# Patient Record
Sex: MALE | Race: WHITE | Employment: FULL TIME | URBAN - METROPOLITAN AREA
[De-identification: names, ages, dates, MRNs, and addresses within clinical notes are randomized per-mention and may not be internally consistent; named-entity substitution may affect disease eponyms.]

---

## 2022-05-08 ENCOUNTER — HOSPITAL ENCOUNTER (EMERGENCY)
Age: 52
Discharge: HOME | End: 2022-05-08
Attending: EMERGENCY MEDICINE
Payer: COMMERCIAL

## 2022-05-08 ENCOUNTER — APPOINTMENT (OUTPATIENT)
Dept: GENERAL RADIOLOGY | Age: 52
End: 2022-05-08
Attending: EMERGENCY MEDICINE
Payer: COMMERCIAL

## 2022-05-08 VITALS
RESPIRATION RATE: 19 BRPM | HEART RATE: 68 BPM | DIASTOLIC BLOOD PRESSURE: 77 MMHG | BODY MASS INDEX: 27.92 KG/M2 | OXYGEN SATURATION: 94 % | TEMPERATURE: 97.9 F | HEIGHT: 70 IN | SYSTOLIC BLOOD PRESSURE: 122 MMHG | WEIGHT: 195 LBS

## 2022-05-08 DIAGNOSIS — R55 VASOVAGAL EPISODE: ICD-10-CM

## 2022-05-08 DIAGNOSIS — R19.7 NAUSEA VOMITING AND DIARRHEA: ICD-10-CM

## 2022-05-08 DIAGNOSIS — R11.2 NAUSEA VOMITING AND DIARRHEA: ICD-10-CM

## 2022-05-08 DIAGNOSIS — R55 SYNCOPE AND COLLAPSE: Primary | ICD-10-CM

## 2022-05-08 LAB
ALBUMIN SERPL-MCNC: 3.8 G/DL (ref 3.5–5)
ALBUMIN/GLOB SERPL: 1.4 {RATIO}
ALP SERPL-CCNC: 67 U/L (ref 45–117)
ALT SERPL-CCNC: 14 U/L (ref 13–61)
ANION GAP SERPL CALC-SCNC: 13 MMOL/L (ref 7–16)
AST SERPL-CCNC: 25 U/L (ref 15–37)
BASOPHILS # BLD: 0 K/UL (ref 0–0.2)
BASOPHILS NFR BLD: 0 % (ref 0–2)
BILIRUB SERPL-MCNC: 1.2 MG/DL (ref 0.2–1.1)
BUN SERPL-MCNC: 18 MG/DL (ref 7–18)
CALCIUM SERPL-MCNC: 8.6 MG/DL (ref 8.3–10.4)
CHLORIDE SERPL-SCNC: 105 MMOL/L (ref 98–107)
CO2 SERPL-SCNC: 22 MMOL/L (ref 21–32)
CREAT SERPL-MCNC: 0.87 MG/DL (ref 0.8–1.5)
DIFFERENTIAL METHOD BLD: ABNORMAL
EOSINOPHIL # BLD: 0.1 K/UL (ref 0–0.8)
EOSINOPHIL NFR BLD: 2 % (ref 0.5–7.8)
ERYTHROCYTE [DISTWIDTH] IN BLOOD BY AUTOMATED COUNT: 12.8 % (ref 11.9–14.6)
GLOBULIN SER CALC-MCNC: 2.7 G/DL (ref 2.3–3.5)
GLUCOSE SERPL-MCNC: 119 MG/DL (ref 65–100)
HCT VFR BLD AUTO: 47 % (ref 41.1–50.3)
HGB BLD-MCNC: 15.4 G/DL (ref 13.6–17.2)
IMM GRANULOCYTES # BLD AUTO: 0 K/UL (ref 0–0.5)
IMM GRANULOCYTES NFR BLD AUTO: 0 % (ref 0–5)
LIPASE SERPL-CCNC: 20 U/L (ref 13–60)
LYMPHOCYTES # BLD: 0.7 K/UL (ref 0.5–4.6)
LYMPHOCYTES NFR BLD: 11 % (ref 13–44)
MCH RBC QN AUTO: 29.6 PG (ref 26.1–32.9)
MCHC RBC AUTO-ENTMCNC: 32.8 G/DL (ref 31.4–35)
MCV RBC AUTO: 90.4 FL (ref 79.6–97.8)
MONOCYTES # BLD: 0.5 K/UL (ref 0.1–1.3)
MONOCYTES NFR BLD: 9 % (ref 4–12)
NEUTS SEG # BLD: 4.5 K/UL (ref 1.7–8.2)
NEUTS SEG NFR BLD: 78 % (ref 43–78)
NRBC # BLD: 0 K/UL (ref 0–0.2)
PLATELET # BLD AUTO: 148 K/UL (ref 150–450)
PMV BLD AUTO: 10.9 FL (ref 9.4–12.3)
POTASSIUM SERPL-SCNC: 3.6 MMOL/L (ref 3.5–5.1)
PROT SERPL-MCNC: 6.5 G/DL (ref 6.4–8.2)
RBC # BLD AUTO: 5.2 M/UL (ref 4.23–5.6)
SODIUM SERPL-SCNC: 140 MMOL/L (ref 136–145)
TROPONIN T SERPL HS-MCNC: <6 NG/L (ref 0–22)
TROPONIN T SERPL HS-MCNC: <6 NG/L (ref 0–22)
WBC # BLD AUTO: 5.8 K/UL (ref 4.3–11.1)

## 2022-05-08 PROCEDURE — 85025 COMPLETE CBC W/AUTO DIFF WBC: CPT

## 2022-05-08 PROCEDURE — 71045 X-RAY EXAM CHEST 1 VIEW: CPT

## 2022-05-08 PROCEDURE — 84484 ASSAY OF TROPONIN QUANT: CPT

## 2022-05-08 PROCEDURE — 99285 EMERGENCY DEPT VISIT HI MDM: CPT

## 2022-05-08 PROCEDURE — 83690 ASSAY OF LIPASE: CPT

## 2022-05-08 PROCEDURE — 74011250636 HC RX REV CODE- 250/636: Performed by: EMERGENCY MEDICINE

## 2022-05-08 PROCEDURE — 93005 ELECTROCARDIOGRAM TRACING: CPT | Performed by: EMERGENCY MEDICINE

## 2022-05-08 PROCEDURE — 80053 COMPREHEN METABOLIC PANEL: CPT

## 2022-05-08 PROCEDURE — 96374 THER/PROPH/DIAG INJ IV PUSH: CPT

## 2022-05-08 RX ORDER — SODIUM CHLORIDE 0.9 % (FLUSH) 0.9 %
5-10 SYRINGE (ML) INJECTION AS NEEDED
Status: DISCONTINUED | OUTPATIENT
Start: 2022-05-08 | End: 2022-05-08 | Stop reason: HOSPADM

## 2022-05-08 RX ORDER — ONDANSETRON 4 MG/1
4 TABLET, FILM COATED ORAL
Qty: 12 TABLET | Refills: 2 | Status: SHIPPED | OUTPATIENT
Start: 2022-05-08

## 2022-05-08 RX ORDER — FAMOTIDINE 20 MG/1
20 TABLET, FILM COATED ORAL 2 TIMES DAILY
Qty: 20 TABLET | Refills: 0 | Status: SHIPPED | OUTPATIENT
Start: 2022-05-08 | End: 2022-05-18

## 2022-05-08 RX ORDER — SODIUM CHLORIDE 0.9 % (FLUSH) 0.9 %
5-10 SYRINGE (ML) INJECTION EVERY 8 HOURS
Status: DISCONTINUED | OUTPATIENT
Start: 2022-05-08 | End: 2022-05-08 | Stop reason: HOSPADM

## 2022-05-08 RX ORDER — ONDANSETRON 2 MG/ML
4 INJECTION INTRAMUSCULAR; INTRAVENOUS
Status: COMPLETED | OUTPATIENT
Start: 2022-05-08 | End: 2022-05-08

## 2022-05-08 RX ADMIN — ONDANSETRON 4 MG: 2 INJECTION INTRAMUSCULAR; INTRAVENOUS at 10:52

## 2022-05-08 NOTE — ED TRIAGE NOTES
Patient brought in by EMS. Ambulated from stretcher to ER bed without difficulty. Patient was in [place] this morning and had 2 syncopal episodes during the service. He is not sure if he lost consciousness but states \"I blacked out twice. \"  Satred feeling nauseated after supper yesterday, otherwise no recent illness.

## 2022-05-08 NOTE — ED NOTES
I have reviewed discharge instructions with the patient. The patient verbalized understanding. Patient left ED via Discharge Method: ambulatory to Home with Family. Opportunity for questions and clarification provided. Patient given 2 scripts. To continue your aftercare when you leave the hospital, you may receive an automated call from our care team to check in on how you are doing. This is a free service and part of our promise to provide the best care and service to meet your aftercare needs.  If you have questions, or wish to unsubscribe from this service please call [phone]. Thank you for Choosing our [facility] Emergency Department.

## 2022-05-08 NOTE — ED PROVIDER NOTES
57-year-old male brought in by EMS. Syncopal episode at [employer]. Patient states he started feeling bad with some abdominal distention discomfort nausea after eating fast food about noon yesterday. That persisted through the evening. Woke up 3 AM with vomiting and diarrhea. No bleeding. He had chills and sweats but no fever. Abdominal discomfort and minimal cramping. Was at [employer] today when he started feeling lightheaded and everything went dark and he blacked out once or twice. EMS was called. Found him in normal sinus rhythm. Patient really denies any pain other than some general abdominal discomfort. No headache, chest pain, shortness of breath. No back pain. No sick contacts. Patient states no medical problems no surgery does not take any medications. His wife states also that she had some vomiting and some nausea yesterday but is doing better at this point. She states the patient was in [employer] when he seemed to not respond for a few seconds with some snoring. There was no jerking. Lasted a few seconds and then happened again. May have happened a third time after some acquaintances laid him on the floor. There were no complaints of pain or cyanosis or focal neurologic issues. The history is provided by the patient. Dizziness  This is a new problem. The current episode started less than 1 hour ago. The problem has been gradually improving. There was no focality noted. Pertinent negatives include no movement disorder. There has been no fever. Associated symptoms include vomiting and nausea. Pertinent negatives include no shortness of breath, no chest pain and no headaches. No past medical history on file. No past surgical history on file. No family history on file.     Social History     Socioeconomic History    Marital status:      Spouse name: Not on file    Number of children: Not on file    Years of education: Not on file    Highest education level: Not on file   Occupational History    Not on file   Tobacco Use    Smoking status: Not on file    Smokeless tobacco: Not on file   Substance and Sexual Activity    Alcohol use: Not on file    Drug use: Not on file    Sexual activity: Not on file   Other Topics Concern    Not on file   Social History Narrative    Not on file     Social Determinants of Health     Financial Resource Strain:     Difficulty of Paying Living Expenses: Not on file   Food Insecurity:     Worried About Running Out of Food in the Last Year: Not on file    Pura of Food in the Last Year: Not on file   Transportation Needs:     Lack of Transportation (Medical): Not on file    Lack of Transportation (Non-Medical): Not on file   Physical Activity:     Days of Exercise per Week: Not on file    Minutes of Exercise per Session: Not on file   Stress:     Feeling of Stress : Not on file   Social Connections:     Frequency of Communication with Friends and Family: Not on file    Frequency of Social Gatherings with Friends and Family: Not on file    Attends [employer]ology Services: Not on file    Active Member of [address] [place] [address] or Organizations: Not on file    Attends Club or Organization Meetings: Not on file    Marital Status: Not on file   Intimate Partner Violence:     Fear of Current or Ex-Partner: Not on file    Emotionally Abused: Not on file    Physically Abused: Not on file    Sexually Abused: Not on file   Housing Stability:     Unable to Pay for Housing in the Last Year: Not on file    Number of [place] in the Last Year: Not on file    Unstable Housing in the Last Year: Not on file         ALLERGIES: Patient has no known allergies. Review of Systems   Constitutional: Positive for chills, diaphoresis and fatigue. Negative for fever. HENT: Positive for congestion. Eyes: Negative for visual disturbance. Respiratory: Negative for cough and shortness of breath. Cardiovascular: Negative for chest pain and palpitations. Gastrointestinal: Positive for abdominal pain, diarrhea, nausea and vomiting. Negative for blood in stool. Genitourinary: Negative for dysuria and flank pain. Musculoskeletal: Negative for back pain and neck pain. Skin: Negative for color change and rash. Neurological: Positive for dizziness, syncope and light-headedness. Negative for headaches. All other systems reviewed and are negative. Vitals:    05/08/22 1004 05/08/22 1005 05/08/22 1023 05/08/22 1030   BP:  124/79 121/83 (!) 134/94   Pulse:  75  84   Resp:  15     Temp:  97.9 °F (36.6 °C)     SpO2: 96% 95%     Weight:  88.5 kg (195 lb)     Height:  5' 10\" (1.778 m)              Physical Exam  Vitals and nursing note reviewed. Constitutional:       General: He is not in acute distress. Appearance: He is well-developed. HENT:      Head: Normocephalic and atraumatic. Right Ear: External ear normal.      Left Ear: External ear normal.      Mouth/Throat:      Pharynx: No oropharyngeal exudate. Eyes:      Conjunctiva/sclera: Conjunctivae normal.      Pupils: Pupils are equal, round, and reactive to light. Cardiovascular:      Rate and Rhythm: Normal rate and regular rhythm. Heart sounds: No murmur heard. Pulmonary:      Effort: No respiratory distress. Breath sounds: Normal breath sounds. Abdominal:      General: Bowel sounds are normal.      Palpations: Abdomen is soft. There is no mass. Tenderness: There is no abdominal tenderness. There is no guarding or rebound. Hernia: No hernia is present. Musculoskeletal:         General: No swelling or tenderness. Cervical back: Normal range of motion and neck supple. Skin:     General: Skin is warm and dry. Neurological:      Mental Status: He is alert and oriented to person, place, and time. Comments: Nl speech  And gait.    Psychiatric:         Speech: Speech normal.          MDM  Number of Diagnoses or Management Options  Diagnosis management comments: Potential vasovagal episode. Assess for dehydration. Give IV fluids and nausea medication. Check EKG and troponins. Monitor for dysrhythmia. Patient has not slept much or eaten anything this morning. Did have some thin to drink. Water. Check for anemia, diabetes, screen for bacterial infection. Amount and/or Complexity of Data Reviewed  Clinical lab tests: ordered and reviewed  Tests in the radiology section of CPT®: ordered and reviewed  Tests in the medicine section of CPT®: ordered and reviewed  Obtain history from someone other than the patient: yes  Independent visualization of images, tracings, or specimens: yes (Interpretation of EKG shows normal sinus rhythm 77. No ST-T changes. No ectopy. Normal QT interval.)    Risk of Complications, Morbidity, and/or Mortality  Presenting problems: moderate  Diagnostic procedures: minimal  Management options: low    Patient Progress  Patient progress: stable         Procedures      Results Include:    Recent Results (from the past 24 hour(s))   CBC WITH AUTOMATED DIFF    Collection Time: 05/08/22 10:27 AM   Result Value Ref Range    WBC 5.8 4.3 - 11.1 K/uL    RBC 5.20 4.23 - 5.60 M/uL    HGB 15.4 13.6 - 17.2 g/dL    HCT 47.0 41.1 - 50.3 %    MCV 90.4 79.6 - 97.8 FL    MCH 29.6 26.1 - 32.9 PG    MCHC 32.8 31.4 - 35.0 g/dL    RDW 12.8 11.9 - 14.6 %    PLATELET 549 (L) 408 - 450 K/uL    MPV 10.9 9.4 - 12.3 FL    ABSOLUTE NRBC 0.00 0.0 - 0.2 K/uL    DF AUTOMATED      NEUTROPHILS 78 43 - 78 %    LYMPHOCYTES 11 (L) 13 - 44 %    MONOCYTES 9 4.0 - 12.0 %    EOSINOPHILS 2 0.5 - 7.8 %    BASOPHILS 0 0.0 - 2.0 %    IMMATURE GRANULOCYTES 0 0.0 - 5.0 %    ABS. NEUTROPHILS 4.5 1.7 - 8.2 K/UL    ABS. LYMPHOCYTES 0.7 0.5 - 4.6 K/UL    ABS. MONOCYTES 0.5 0.1 - 1.3 K/UL    ABS. EOSINOPHILS 0.1 0.0 - 0.8 K/UL    ABS. BASOPHILS 0.0 0.0 - 0.2 K/UL    ABS. IMM.  GRANS. 0.0 0.0 - 0.5 K/UL   METABOLIC PANEL, COMPREHENSIVE    Collection Time: 05/08/22 10:27 AM   Result Value Ref Range    Sodium 140 136 - 145 mmol/L    Potassium 3.6 3.5 - 5.1 mmol/L    Chloride 105 98 - 107 mmol/L    CO2 22 21 - 32 mmol/L    Anion gap 13 7.0 - 16.0 mmol/L    Glucose 119 (H) 65 - 100 mg/dL    BUN 18 7.0 - 18.0 MG/DL    Creatinine 0.87 0.8 - 1.5 MG/DL    GFR est AA >118 >60 ml/min/1.73m2    GFR est non-AA >60 >60 ml/min/1.73m2    Calcium 8.6 8.3 - 10.4 MG/DL    Bilirubin, total 1.2 (H) 0.2 - 1.1 MG/DL    ALT (SGPT) 14 13.0 - 61.0 U/L    AST (SGOT) 25 15 - 37 U/L    Alk. phosphatase 67 45.0 - 117.0 U/L    Protein, total 6.5 6.4 - 8.2 g/dL    Albumin 3.8 3.5 - 5.0 g/dL    Globulin 2.7 2.3 - 3.5 g/dL    A-G Ratio 1.4     LIPASE    Collection Time: 05/08/22 10:27 AM   Result Value Ref Range    Lipase 20 13 - 60 U/L   TROPONIN-T    Collection Time: 05/08/22 10:30 AM   Result Value Ref Range    Troponin-T, QT, High sensitivity <6.0 0 - 22 ng/L     XR CHEST PORT    Result Date: 5/8/2022  PORTABLE CHEST, May 8, 2022 at 1026 hours CLINICAL HISTORY:  2 syncopal episodes this morning. COMPARISON:  None. FINDINGS:  AP erect image demonstrates no confluent infiltrate or significant pleural fluid. There is mild atelectasis/infiltrate at the left lung base. The heart size is within normal limits without evidence of congestive heart failure or pneumothorax. The bony thorax appears intact on this view. There are overlying radiopaque support devices. MILD LEFT BASILAR ATELECTASIS/INFILTRATE. Patient without cough. No shortness of breath. Does have some slight left-sided chest pain. He is tender somewhat at the left costal margin anteriorly which she says is sore. This started up early this morning. May be a consequence of the vomiting. Doubt pneumonia. Doubt pulmonary embolism. 11:22 AM  Resting more comfortably. No more episodes. No vomiting. No dysrhythmia on monitor. Plan is to check in second troponin. If no more symptoms, suspect will be stable for discharge home.

## 2022-05-08 NOTE — DISCHARGE INSTRUCTIONS
Sips clear liquids 6-12 hours, then [employer] [facility] (bananas, rice, apple sauce, toast). Advance to soup/sanwiches as tolerated. Recheck your doctor 2 days if not improving. Recheck sooner for worse pain/fever/vomiting/bleeding. Tylenol if fever. If prescribed, phenergan or Zofran are for Nausea. Over-the-counter Imodium for diarrhea. If episodes recur, call primary care doctor for appointment follow-up. Recheck here sooner for worse or worrisome symptoms.

## 2022-05-18 PROBLEM — R10.11 RIGHT UPPER QUADRANT ABDOMINAL PAIN: Status: ACTIVE | Noted: 2022-05-18

## 2022-05-18 PROBLEM — R42 DIZZINESS: Status: ACTIVE | Noted: 2022-05-18

## 2022-05-25 ENCOUNTER — OFFICE VISIT (OUTPATIENT)
Dept: FAMILY MEDICINE CLINIC | Facility: CLINIC | Age: 52
End: 2022-05-25
Payer: COMMERCIAL

## 2022-05-25 VITALS
OXYGEN SATURATION: 97 % | SYSTOLIC BLOOD PRESSURE: 110 MMHG | HEART RATE: 76 BPM | HEIGHT: 70 IN | WEIGHT: 201.8 LBS | BODY MASS INDEX: 28.89 KG/M2 | DIASTOLIC BLOOD PRESSURE: 78 MMHG

## 2022-05-25 DIAGNOSIS — Z12.11 SCREENING FOR COLON CANCER: ICD-10-CM

## 2022-05-25 DIAGNOSIS — K80.20 CALCULUS OF GALLBLADDER WITHOUT CHOLECYSTITIS WITHOUT OBSTRUCTION: ICD-10-CM

## 2022-05-25 DIAGNOSIS — Z00.00 ANNUAL PHYSICAL EXAM: Primary | ICD-10-CM

## 2022-05-25 DIAGNOSIS — Z23 IMMUNIZATION DUE: ICD-10-CM

## 2022-05-25 DIAGNOSIS — R42 DIZZINESS: ICD-10-CM

## 2022-05-25 PROCEDURE — 99396 PREV VISIT EST AGE 40-64: CPT | Performed by: FAMILY MEDICINE

## 2022-05-25 PROCEDURE — 99213 OFFICE O/P EST LOW 20 MIN: CPT | Performed by: FAMILY MEDICINE

## 2022-05-25 RX ORDER — ZOSTER VACCINE RECOMBINANT, ADJUVANTED 50 MCG/0.5
0.5 KIT INTRAMUSCULAR SEE ADMIN INSTRUCTIONS
Qty: 0.5 ML | Refills: 0 | Status: SHIPPED | OUTPATIENT
Start: 2022-05-25 | End: 2022-11-21

## 2022-05-25 RX ORDER — LORATADINE 10 MG/1
10 TABLET ORAL DAILY
COMMUNITY

## 2022-05-25 ASSESSMENT — ENCOUNTER SYMPTOMS
EYE ITCHING: 0
NAUSEA: 0
EYE DISCHARGE: 0
CONSTIPATION: 0
COUGH: 0
SORE THROAT: 0
VOMITING: 0
ABDOMINAL PAIN: 0
WHEEZING: 0
DIARRHEA: 0
COLOR CHANGE: 0
FACIAL SWELLING: 0
SHORTNESS OF BREATH: 0
SINUS PAIN: 0
SINUS PRESSURE: 0
BACK PAIN: 0
BLOOD IN STOOL: 0
EYE PAIN: 0
ABDOMINAL DISTENTION: 0
RHINORRHEA: 0
EYE REDNESS: 0
STRIDOR: 0
CHEST TIGHTNESS: 0

## 2022-05-25 ASSESSMENT — PATIENT HEALTH QUESTIONNAIRE - PHQ9
SUM OF ALL RESPONSES TO PHQ QUESTIONS 1-9: 0
1. LITTLE INTEREST OR PLEASURE IN DOING THINGS: 0
SUM OF ALL RESPONSES TO PHQ QUESTIONS 1-9: 0
2. FEELING DOWN, DEPRESSED OR HOPELESS: 0
SUM OF ALL RESPONSES TO PHQ9 QUESTIONS 1 & 2: 0

## 2022-05-25 ASSESSMENT — LIFESTYLE VARIABLES
HOW OFTEN DO YOU HAVE A DRINK CONTAINING ALCOHOL: 2-3 TIMES A WEEK
HOW MANY STANDARD DRINKS CONTAINING ALCOHOL DO YOU HAVE ON A TYPICAL DAY: 1 OR 2

## 2022-05-25 NOTE — ASSESSMENT & PLAN NOTE
Although tiny stone present, does not appear to be causing pt any difficulty based on his tolerance with pizza and red meat. Recommended monitoring s/s for now. Consider HIDA if his discomfort continues, but advised against surgery currently.

## 2022-05-25 NOTE — PROGRESS NOTES
[address] [place] [address]  _______________________________________  Simone Jhaveri MD                 [address] [place] [address] Drive, [address]. [place], [address] [place] [address]                     Christianne Lemon [address]                                                                                    Phone: [phone]                                                                                    Fax: [phone]    Brionna Barajas is a 46 y.o. male who is seen for evaluation of   Chief Complaint   Patient presents with    Dizziness     Improved a little but still experiences dizzy episodes    Abdominal Pain     Improved ruq pain, discuss u/s results       HPI:   Mr. Susie Allison returns today for CPE and f/u of dizziness and RUQ pain. On our last visit, he c/o syncopal episode at [employer] on mothers day after having 3 vomiting spells prior to [facility]. Pt was seen in ER and was told that he had food poisoning. Pt went to [facility] immediately after with only residual abd cramping. A few days later, he woke up with n/v and abd cramping x1 episode, which quickly subsided. He has had not additional s/s since other than dizziness and general malaise. He denies any fever, chills, focal neur deficits, n/v/d, dysphagia. Still having mild dizziness that lasts for several minutes. He denies any CP, sob, h/a, gait alteration. No immediate family hx of CAD or CVD. Review of Systems:  Review of Systems   Constitutional: Negative for activity change, appetite change, chills, diaphoresis, fatigue, fever and unexpected weight change. HENT: Negative for congestion, ear discharge, ear pain, facial swelling, hearing loss, mouth sores, nosebleeds, postnasal drip, rhinorrhea, sinus pressure, sinus pain, sore throat and tinnitus. Eyes: Negative for pain, discharge, redness, itching and visual disturbance. Respiratory: Negative for cough, chest tightness, shortness of breath, wheezing and stridor.     Cardiovascular: Negative for chest pain, palpitations and leg swelling. Gastrointestinal: Negative for abdominal distention, abdominal pain, blood in stool, constipation, diarrhea, nausea and vomiting. Endocrine: Negative for cold intolerance, heat intolerance, polydipsia, polyphagia and polyuria. Genitourinary: Negative for decreased urine volume, difficulty urinating, dysuria, flank pain, frequency, hematuria and urgency. Musculoskeletal: Negative for arthralgias, back pain, gait problem, joint swelling, myalgias and neck pain. Skin: Negative for color change, pallor, rash and wound. Neurological: Positive for dizziness. Negative for tremors, seizures, syncope, facial asymmetry, speech difficulty, weakness, light-headedness, numbness and headaches. Psychiatric/Behavioral: Negative for agitation, behavioral problems, confusion, hallucinations, self-injury, sleep disturbance and suicidal ideas. The patient is not nervous/anxious. History:  History reviewed. No pertinent past medical history. Past Surgical History:   Procedure Laterality Date    ORTHOPEDIC SURGERY Right 2010    Right elbow tendon release surgery       Family History   Problem Relation Age of Onset    No Known Problems Father     No Known Problems Mother        Social History     Tobacco Use    Smoking status: Never Smoker    Smokeless tobacco: Never Used   Substance Use Topics    Alcohol use: Yes     Alcohol/week: 2.0 standard drinks       No Known Allergies    Current Outpatient Medications   Medication Sig Dispense Refill    loratadine (CLARITIN) 10 MG tablet Take 10 mg by mouth daily      zoster recombinant adjuvanted vaccine (SHINGRIX) 50 MCG/0.5ML SUSR injection Inject 0.5 mLs into the muscle See Admin Instructions 1 dose now and repeat in 2-6 months 0.5 mL 0     No current facility-administered medications for this visit.        Vitals:    /78 (Site: Right Upper Arm, Position: Standing, Cuff Size: Large Adult)   Pulse 76   Ht 5' 10\" (1.778 m) Eyes:      General: No scleral icterus. Right eye: No discharge. Left eye: No discharge. Extraocular Movements: Extraocular movements intact. Conjunctiva/sclera: Conjunctivae normal.      Pupils: Pupils are equal, round, and reactive to light. Cardiovascular:      Rate and Rhythm: Normal rate and regular rhythm. Pulses: Normal pulses. Heart sounds: No murmur heard. No friction rub. No gallop. Pulmonary:      Effort: Pulmonary effort is normal. No respiratory distress. Breath sounds: No stridor. No wheezing, rhonchi or rales. Chest:      Chest wall: No tenderness. Abdominal:      General: Abdomen is flat. Bowel sounds are normal. There is no distension. Palpations: Abdomen is soft. There is no mass. Tenderness: There is no abdominal tenderness. There is no right CVA tenderness or guarding. Musculoskeletal:         General: No tenderness. Normal range of motion. Cervical back: Neck supple. No rigidity or tenderness. Right lower leg: No edema. Left lower leg: No edema. Lymphadenopathy:      Cervical: No cervical adenopathy. Skin:     General: Skin is warm and dry. Coloration: Skin is not jaundiced or pale. Findings: No bruising, erythema or rash. Neurological:      General: No focal deficit present. Mental Status: He is alert and oriented to person, place, and time. Motor: No weakness. Coordination: Coordination normal.      Gait: Gait normal.   Psychiatric:         Mood and Affect: Mood normal.         Behavior: Behavior normal.         Thought Content: Thought content normal.         Judgment: Judgment normal.         Assessment/Plan:     ICD-10-CM    1. Annual physical exam  Z00.00 zoster recombinant adjuvanted vaccine [facility]) 50 MCG/0.5ML SUSR injection   2. Dizziness  R42 120 [facility]   3. Calculus of gallbladder without cholecystitis without obstruction  K80.20    4.  Screening for colon cancer  Z12.11 [facility]   5. Immunization due  Z23 zoster recombinant adjuvanted vaccine [facility]) 50 MCG/0.5ML SUSR injection        Patient Active Problem List    Diagnosis Date Noted    Annual physical exam 05/25/2022     Priority: Medium     Assessment & Plan Note:      Encouraged 5 servings of fruits and veggies daily. Instructed to drink approx 2 L of fluid daily, mostly water. Recommended 30 sustained minutes of aerobic exercise daily (e.g. cycling, rowing, jogging). Limit high calorie processed foods, red meat, egg yolks <1 daily, dairy products, butter, anderson, fried and fast foods. Colon screening- Referral sent to GI for colonoscopy. Immunizations: Rx sent for Shingrix.  Calculus of gallbladder without cholecystitis without obstruction 05/25/2022     Priority: Medium     Assessment & Plan Note:      Although tiny stone present, does not appear to be causing pt any difficulty based on his tolerance with pizza and red meat. Recommended monitoring s/s for now. Consider HIDA if his discomfort continues, but advised against surgery currently.  Dizziness 05/18/2022     Priority: Medium     Assessment & Plan Note:     Orthostatics normal. EKG reassuring. No carotid buits. Will ask cardiology to see pt- arrhythmia?        Right upper quadrant abdominal pain 05/18/2022        Godfrey Pope MD

## 2022-05-25 NOTE — ASSESSMENT & PLAN NOTE
Encouraged 5 servings of fruits and veggies daily. Instructed to drink approx 2 L of fluid daily, mostly water. Recommended 30 sustained minutes of aerobic exercise daily (e.g. cycling, rowing, jogging). Limit high calorie processed foods, red meat, egg yolks <1 daily, dairy products, butter, anderson, fried and fast foods. Colon screening- Referral sent to GI for colonoscopy. Immunizations: Rx sent for Shingrix.

## 2022-06-24 PROBLEM — Z00.00 ANNUAL PHYSICAL EXAM: Status: RESOLVED | Noted: 2022-05-25 | Resolved: 2022-06-24

## 2023-05-30 ENCOUNTER — OFFICE VISIT (OUTPATIENT)
Dept: FAMILY MEDICINE CLINIC | Facility: CLINIC | Age: 53
End: 2023-05-30
Payer: COMMERCIAL

## 2023-05-30 ENCOUNTER — HOSPITAL ENCOUNTER (OUTPATIENT)
Dept: GENERAL RADIOLOGY | Age: 53
Discharge: HOME | End: 2023-06-01
Payer: COMMERCIAL

## 2023-05-30 VITALS
SYSTOLIC BLOOD PRESSURE: 122 MMHG | WEIGHT: 207 LBS | BODY MASS INDEX: 29.63 KG/M2 | DIASTOLIC BLOOD PRESSURE: 80 MMHG | HEIGHT: 70 IN

## 2023-05-30 DIAGNOSIS — Z12.5 SCREENING FOR PROSTATE CANCER: ICD-10-CM

## 2023-05-30 DIAGNOSIS — Z13.6 SCREENING FOR HEART DISEASE: ICD-10-CM

## 2023-05-30 DIAGNOSIS — M25.572 CHRONIC PAIN OF LEFT ANKLE: ICD-10-CM

## 2023-05-30 DIAGNOSIS — Z00.00 ANNUAL PHYSICAL EXAM: Primary | ICD-10-CM

## 2023-05-30 DIAGNOSIS — Z11.59 NEED FOR HEPATITIS C SCREENING TEST: ICD-10-CM

## 2023-05-30 DIAGNOSIS — G89.29 CHRONIC PAIN OF LEFT ANKLE: ICD-10-CM

## 2023-05-30 DIAGNOSIS — Z11.4 SCREENING FOR HIV (HUMAN IMMUNODEFICIENCY VIRUS): ICD-10-CM

## 2023-05-30 DIAGNOSIS — R13.19 ESOPHAGEAL DYSPHAGIA: ICD-10-CM

## 2023-05-30 PROCEDURE — 99214 OFFICE O/P EST MOD 30 MIN: CPT | Performed by: FAMILY MEDICINE

## 2023-05-30 PROCEDURE — 73610 X-RAY EXAM OF ANKLE: CPT

## 2023-05-30 PROCEDURE — 99396 PREV VISIT EST AGE 40-64: CPT | Performed by: FAMILY MEDICINE

## 2023-05-30 ASSESSMENT — ENCOUNTER SYMPTOMS
ABDOMINAL DISTENTION: 0
SINUS PAIN: 0
SHORTNESS OF BREATH: 0
EYE DISCHARGE: 0
BLOOD IN STOOL: 0
ABDOMINAL PAIN: 0
EYE PAIN: 0
BACK PAIN: 0
DIARRHEA: 0
SORE THROAT: 0
EYE ITCHING: 0
EYE REDNESS: 0
CHEST TIGHTNESS: 0
COLOR CHANGE: 0
FACIAL SWELLING: 0
NAUSEA: 0
STRIDOR: 0
CONSTIPATION: 0
VOMITING: 0
RHINORRHEA: 0
SINUS PRESSURE: 0
WHEEZING: 0
COUGH: 0

## 2023-05-30 NOTE — ASSESSMENT & PLAN NOTE
Encouraged 5 servings of fruits and veggies daily. Instructed to drink approx 2 L of fluid daily, mostly water. Recommended 30 sustained minutes of aerobic exercise daily (e.g. cycling, rowing, jogging). Limit high calorie processed foods, red meat, egg yolks, dairy products, butter, anderson, fried and fast foods. Recommended influenza vaccination annually. Colon- request records.

## 2023-05-30 NOTE — PROGRESS NOTES
[address] [place] [address]  _______________________________________  Thalia Arteaga MD                 [address] [place] [address],  [address]. [place], [address] [place] [address]                     Christianne Lemon [address]                                                                                    Phone: [phone]                                                                                    Fax: [phone]    Lorrie Resendiz is a 45 y.o. male who is seen for evaluation of   Chief Complaint   Patient presents with    Annual Exam     46 yo CPX    Dysphagia    Ankle Pain     Left ankle pain x6 weeks       HPI:   Mr. Marilia Castro is seen today for CPE.     - c/o L ankle pain x 6 weeks. Tod Rase from 12 foot ladder prior to injury and injured R ankle. Pt cannot recall any injuries otherwise to L ankle. Worse with first getting up from sitting, walking, laying in bed. 5/10 at worst. Takes ibuprofen prior to bed. - c/o episodic periods of difficulty swallowing and feeling of lower esophageal globus with solids. Pt denies any difficulty with liquids. When this occurs, pt is able to swallow liquids. Denies any vomiting. He does have a h/o gerd but, no longer takes ppi. Review of Systems:  Review of Systems   Constitutional:  Negative for activity change, appetite change, chills, diaphoresis, fatigue, fever and unexpected weight change. HENT:  Negative for congestion, ear discharge, ear pain, facial swelling, hearing loss, mouth sores, nosebleeds, postnasal drip, rhinorrhea, sinus pressure, sinus pain, sore throat and tinnitus. Eyes:  Negative for pain, discharge, redness, itching and visual disturbance. Respiratory:  Negative for cough, chest tightness, shortness of breath, wheezing and stridor. Cardiovascular:  Negative for chest pain, palpitations and leg swelling. Gastrointestinal:  Negative for abdominal distention, abdominal pain, blood in stool, constipation, diarrhea, nausea and vomiting.    Endocrine: Negative

## 2023-06-02 ENCOUNTER — HOSPITAL ENCOUNTER (OUTPATIENT)
Dept: GENERAL RADIOLOGY | Age: 53
Discharge: HOME | End: 2023-06-02
Attending: FAMILY MEDICINE
Payer: COMMERCIAL

## 2023-06-02 DIAGNOSIS — Z11.59 NEED FOR HEPATITIS C SCREENING TEST: ICD-10-CM

## 2023-06-02 DIAGNOSIS — Z11.4 SCREENING FOR HIV (HUMAN IMMUNODEFICIENCY VIRUS): ICD-10-CM

## 2023-06-02 DIAGNOSIS — Z00.00 ANNUAL PHYSICAL EXAM: ICD-10-CM

## 2023-06-02 DIAGNOSIS — R13.19 ESOPHAGEAL DYSPHAGIA: ICD-10-CM

## 2023-06-02 DIAGNOSIS — Z13.6 SCREENING FOR HEART DISEASE: ICD-10-CM

## 2023-06-02 DIAGNOSIS — Z12.5 SCREENING FOR PROSTATE CANCER: ICD-10-CM

## 2023-06-02 LAB
ALBUMIN SERPL-MCNC: 3.9 G/DL (ref 3.5–5)
ALBUMIN/GLOB SERPL: 1.3 (ref 0.4–1.6)
ALP SERPL-CCNC: 62 U/L (ref 50–136)
ALT SERPL-CCNC: 27 U/L (ref 12–65)
ANION GAP SERPL CALC-SCNC: 6 MMOL/L (ref 2–11)
AST SERPL-CCNC: 16 U/L (ref 15–37)
BASOPHILS # BLD: 0.1 K/UL (ref 0–0.2)
BASOPHILS NFR BLD: 2 % (ref 0–2)
BILIRUB SERPL-MCNC: 0.6 MG/DL (ref 0.2–1.1)
BUN SERPL-MCNC: 22 MG/DL (ref 6–23)
CALCIUM SERPL-MCNC: 9.3 MG/DL (ref 8.3–10.4)
CHLORIDE SERPL-SCNC: 108 MMOL/L (ref 101–110)
CHOLEST SERPL-MCNC: 131 MG/DL
CO2 SERPL-SCNC: 28 MMOL/L (ref 21–32)
CREAT SERPL-MCNC: 1 MG/DL (ref 0.8–1.5)
DIFFERENTIAL METHOD BLD: ABNORMAL
EOSINOPHIL # BLD: 0.3 K/UL (ref 0–0.8)
EOSINOPHIL NFR BLD: 6 % (ref 0.5–7.8)
ERYTHROCYTE [DISTWIDTH] IN BLOOD BY AUTOMATED COUNT: 12.6 % (ref 11.9–14.6)
GLOBULIN SER CALC-MCNC: 2.9 G/DL (ref 2.8–4.5)
GLUCOSE SERPL-MCNC: 94 MG/DL (ref 65–100)
HCT VFR BLD AUTO: 49 % (ref 41.1–50.3)
HCV AB SER QL: NONREACTIVE
HDLC SERPL-MCNC: 52 MG/DL (ref 40–60)
HDLC SERPL: 2.5
HGB BLD-MCNC: 16.2 G/DL (ref 13.6–17.2)
HIV 1+2 AB+HIV1 P24 AG SERPL QL IA: NONREACTIVE
HIV 1/2 RESULT COMMENT: NORMAL
IMM GRANULOCYTES # BLD AUTO: 0 K/UL (ref 0–0.5)
IMM GRANULOCYTES NFR BLD AUTO: 0 % (ref 0–5)
LDLC SERPL CALC-MCNC: 62.8 MG/DL
LYMPHOCYTES # BLD: 1.5 K/UL (ref 0.5–4.6)
LYMPHOCYTES NFR BLD: 33 % (ref 13–44)
MCH RBC QN AUTO: 30.6 PG (ref 26.1–32.9)
MCHC RBC AUTO-ENTMCNC: 33.1 G/DL (ref 31.4–35)
MCV RBC AUTO: 92.6 FL (ref 82–102)
MONOCYTES # BLD: 0.5 K/UL (ref 0.1–1.3)
MONOCYTES NFR BLD: 11 % (ref 4–12)
NEUTS SEG # BLD: 2.2 K/UL (ref 1.7–8.2)
NEUTS SEG NFR BLD: 48 % (ref 43–78)
NRBC # BLD: 0 K/UL (ref 0–0.2)
PLATELET # BLD AUTO: 118 K/UL (ref 150–450)
PLATELET COMMENT: SLIGHT
PMV BLD AUTO: 11.7 FL (ref 9.4–12.3)
POTASSIUM SERPL-SCNC: 4.4 MMOL/L (ref 3.5–5.1)
PROT SERPL-MCNC: 6.8 G/DL (ref 6.3–8.2)
PSA SERPL-MCNC: 1.1 NG/ML
RBC # BLD AUTO: 5.29 M/UL (ref 4.23–5.6)
RBC MORPH BLD: ABNORMAL
SODIUM SERPL-SCNC: 142 MMOL/L (ref 133–143)
TRIGL SERPL-MCNC: 81 MG/DL (ref 35–150)
TSH, 3RD GENERATION: 1.45 UIU/ML (ref 0.36–3.74)
VLDLC SERPL CALC-MCNC: 16.2 MG/DL (ref 6–23)
WBC # BLD AUTO: 4.6 K/UL (ref 4.3–11.1)
WBC MORPH BLD: ABNORMAL

## 2023-06-02 PROCEDURE — 74240 X-RAY XM UPR GI TRC 1CNTRST: CPT

## 2023-06-02 PROCEDURE — 6370000000 HC RX 637 (ALT 250 FOR IP): Performed by: FAMILY MEDICINE

## 2023-06-02 PROCEDURE — 6360000004 HC RX CONTRAST MEDICATION: Performed by: FAMILY MEDICINE

## 2023-06-02 PROCEDURE — 2500000003 HC RX 250 WO HCPCS: Performed by: FAMILY MEDICINE

## 2023-06-02 PROCEDURE — A4641 RADIOPHARM DX AGENT NOC: HCPCS | Performed by: FAMILY MEDICINE

## 2023-06-02 RX ADMIN — BARIUM SULFATE 355 ML: 0.6 SUSPENSION ORAL at 08:17

## 2023-06-02 RX ADMIN — ANTACID/ANTIFLATULENT 1 EACH: 380; 550; 10; 10 GRANULE, EFFERVESCENT ORAL at 08:13

## 2023-06-02 RX ADMIN — BARIUM SULFATE 140 ML: 980 POWDER, FOR SUSPENSION ORAL at 08:13

## 2023-06-13 PROBLEM — K21.00 GASTROESOPHAGEAL REFLUX DISEASE WITH ESOPHAGITIS WITHOUT HEMORRHAGE: Status: ACTIVE | Noted: 2023-06-13

## 2023-06-13 PROBLEM — M19.072 PRIMARY OSTEOARTHRITIS OF LEFT ANKLE: Status: ACTIVE | Noted: 2023-06-13

## 2023-06-29 PROBLEM — Z00.00 ANNUAL PHYSICAL EXAM: Status: RESOLVED | Noted: 2022-05-25 | Resolved: 2023-06-29
